# Patient Record
Sex: MALE | Race: BLACK OR AFRICAN AMERICAN | Employment: FULL TIME
[De-identification: names, ages, dates, MRNs, and addresses within clinical notes are randomized per-mention and may not be internally consistent; named-entity substitution may affect disease eponyms.]

---

## 2023-03-12 ENCOUNTER — HOSPITAL ENCOUNTER (EMERGENCY)
Facility: HOSPITAL | Age: 38
Discharge: HOME | End: 2023-03-12
Attending: EMERGENCY MEDICINE

## 2023-03-12 VITALS
RESPIRATION RATE: 16 BRPM | WEIGHT: 160 LBS | HEART RATE: 89 BPM | SYSTOLIC BLOOD PRESSURE: 120 MMHG | OXYGEN SATURATION: 98 % | BODY MASS INDEX: 25.11 KG/M2 | DIASTOLIC BLOOD PRESSURE: 76 MMHG | TEMPERATURE: 97.1 F | HEIGHT: 67 IN

## 2023-03-12 DIAGNOSIS — Z77.120 SUSPECTED EXPOSURE TO MOLD: ICD-10-CM

## 2023-03-12 DIAGNOSIS — R11.11 VOMITING WITHOUT NAUSEA, UNSPECIFIED VOMITING TYPE: ICD-10-CM

## 2023-03-12 DIAGNOSIS — J20.9 ACUTE BRONCHITIS, UNSPECIFIED ORGANISM: Primary | ICD-10-CM

## 2023-03-12 PROCEDURE — 99282 EMERGENCY DEPT VISIT SF MDM: CPT

## 2023-03-12 NOTE — ED TRIAGE NOTES
Patient ambulatory to ED c/o cough and vomiting x 4 days. No vomiting episodes since yesterday. Pt states that he is feeling ill and believes that it is from mold exposure to his home. Pt states that he has been dealing with mold for 1 year.      Denies respiratory hx

## 2023-03-12 NOTE — DISCHARGE INSTRUCTIONS
Your exam today is not concerning for an acute process. Your lungs are clear. Vital signs are within normal limits. COVID test was offered today.   You would prefer to purchase a test and do a home test which is reasonable to help with cost.  Stay well-hydrated  Healthy diet  Follow-up at one of the clinics listed above for continued treatment and care, and to discuss your concern of possible mold exposure   To ER if new or worsening symptoms or new concerns

## 2023-03-12 NOTE — ED PROVIDER NOTES
[address]    [facility] EMERGENCY DEPT  3/12/2023, 11:22 AM EDT    Clinical Impression:  1. Acute bronchitis, unspecified organism    2. Vomiting without nausea, unspecified vomiting type    3. Suspected exposure to mold        Assessment/Differential Diagnosis:     Ddx viral infection, bacterial infection, pneumonia, COVID, flu, concern for mold exposure, intra-abdominal process all considered    ED Course:   Initial assessment performed. The patients presenting problems have been discussed, and they are in agreement with the care plan formulated and outlined with them. I have encouraged them to ask questions as they arise throughout their visit. Pt here with concern for mold exposure from leaking roof x months. States started with dry cough 5 days ago, some improvement, but was at work today coughing and sent home. Pt had 1 episode of vomitng 5 days ago, and again yesterday but has had PO since without difficulty. NO fever, chills, ST, SOB, CP, or abd pain. No diarrhea. No rash or joint pain. + sinus congestion. Pt was sent here by his mother for possible mold exposure causing symptoms. NO meds taken. Exam with pt appearing well. Vitals reassuring. Exam with no abnormality, lungs clear, abd benign. Discussed with pt and mother exam with no acute concerns. Offered COVID test, but pt planning on taking home test today. Offered CXR, but pt declines, and would like to follow [facility] with [facility] for [facility] care. This is reasonable given non-concerning exam.  Return precautions discussed        Medical Chart Review:  I have reviewed triage nursing documentation. Review of old medical records with the following pertinent information:       Disposition:  Home  in good condition. Chief Complaint   Patient presents with    Cough    Emesis     HPI:    The history is provided by patient. No  used.     Cristopher Brown is a 45 y.o. male presenting to the Emergency Department with complaints of cough and vomiting. Pt here with concern for mold exposure from leaking roof x months. States started with dry cough 5 days ago, some improvement, but was at work today coughing and sent home. Pt had 1 episode of vomitng 5 days ago, and again yesterday but has had PO since without difficulty. NO fever, chills, ST, SOB, CP, or abd pain. No diarrhea. No rash or joint pain. + sinus congestion. Pt was sent here by his mother for possible mold exposure causing symptoms. NO meds taken. No chronic medical problems. No recent physical due to lack of insurance. I have reviewed all PMHX, FMHX and Social Hx as entered into the medical record in the chart below using the Epic Template. Review of Systems:  Constitutional: neg for fever, chills. ENT:  neg for sore throat, positive for rhinorrhea, negative for ear pain  Respiratory:  positive for cough, no shortness of breath  Cardiovascular:  neg for chest pain  GI:  neg for abdominal pain. + vomiting yesterday  :  No dysuria, hematuria. No Flank pain. MSK: negative for arthralgias, neg for myalgias  Integumentary: no rashes, or skin trauma  Neurological: neg for headaches  All other systems reviewed negative with exception of positives in ROS and HPI. Past Medical History:  No past medical history on file. Past Surgical History:  No past surgical history on file. Family History:  No family history on file. Social History: Allergies: Allergies   Allergen Reactions    Sulfa Antibiotics Rash       Vital Signs:  Vitals:    03/12/23 1100   BP: 120/76   Pulse: 89   Resp: 16   Temp: 97.1 °F (36.2 °C)   SpO2: 98%   Weight: 160 lb (72.6 kg)   Height: 5' 7\" (1.702 m)     Physical Exam:  Vital Signs Reviewed. Nursing Notes Reviewed. Constitutional:  Well developed, well nourished patient. Appearance and behavior are age and situation appropriate. Ambulating normally.  Nontoxic appearing.  Head: Normocephalic, Atraumatic . No facial swelling . No rash. No pain over sinuses with percussion.   Eyes: Visual acuity grossly normal by my exam. Conjunctiva clear,Sclera anicteric. PERRLA.  Eyelids normal   ENT:hearing grossly intact, Canals normal, TMs normal. Nose patent, normal septum, clear congestion present. Throat clear. No lesions of oral mucosa.   Neck:  supple, FROM , no nuchal rigidity. No adenopathy. No swelling   Lungs: No respiratory distress. Lungs CTAB . No cough on exam.  CV:  RR&R without murmur.   Neuro:  A&O, no obvious neuro deficit.   Skin:  Warm, dry, no rash.    Diagnostics:    Labs -   No results found for this or any previous visit (from the past 12 hour(s)).    Radiologic Studies -   No orders to display       Medications given in the ED-  Medications - No data to display    Please note that this dictation was completed with [employer], the [employer] voice recognition software.  Quite often unanticipated grammatical, syntax, homophones, and other interpretive errors are inadvertently transcribed by the computer software.  Please disregard these errors.  Please excuse any errors that have escaped final proofreading.       Eboni Campos PA-C  03/12/23 [id number]

## 2023-03-12 NOTE — Clinical Note
Isabel Sorto was seen and treated in our emergency department on 3/12/2023. He may return to work on 03/13/2023. If you have any questions or concerns, please don't hesitate to call.       Josefina Barbosa PA-C

## 2023-03-12 NOTE — ED NOTES
Pt arrived with c/o vomiting and cough. Pt states the cough has been there since Wednesday and the vomiting started yesterday. Pt believes that the symptoms are caused by potential mold from a leak in his roof. Pt denies lightheadedness, diarrhea, headaches or dizziness.         Dionisio Apgar, JOSELITO  [id number]